# Patient Record
Sex: FEMALE | Race: WHITE
[De-identification: names, ages, dates, MRNs, and addresses within clinical notes are randomized per-mention and may not be internally consistent; named-entity substitution may affect disease eponyms.]

---

## 2017-06-14 ENCOUNTER — HOSPITAL ENCOUNTER (OUTPATIENT)
Dept: HOSPITAL 10 - SDS | Age: 47
Discharge: HOME | End: 2017-06-14
Attending: ORTHOPAEDIC SURGERY
Payer: COMMERCIAL

## 2017-06-14 VITALS — SYSTOLIC BLOOD PRESSURE: 141 MMHG | RESPIRATION RATE: 22 BRPM | DIASTOLIC BLOOD PRESSURE: 75 MMHG | HEART RATE: 86 BPM

## 2017-06-14 VITALS — RESPIRATION RATE: 17 BRPM | HEART RATE: 88 BPM | SYSTOLIC BLOOD PRESSURE: 134 MMHG | DIASTOLIC BLOOD PRESSURE: 64 MMHG

## 2017-06-14 VITALS — DIASTOLIC BLOOD PRESSURE: 64 MMHG | RESPIRATION RATE: 16 BRPM | HEART RATE: 80 BPM | SYSTOLIC BLOOD PRESSURE: 131 MMHG

## 2017-06-14 VITALS — HEART RATE: 80 BPM | RESPIRATION RATE: 16 BRPM | DIASTOLIC BLOOD PRESSURE: 63 MMHG | SYSTOLIC BLOOD PRESSURE: 135 MMHG

## 2017-06-14 VITALS — DIASTOLIC BLOOD PRESSURE: 65 MMHG | HEART RATE: 92 BPM | SYSTOLIC BLOOD PRESSURE: 136 MMHG | RESPIRATION RATE: 17 BRPM

## 2017-06-14 VITALS
BODY MASS INDEX: 35.96 KG/M2 | WEIGHT: 190.48 LBS | WEIGHT: 190.48 LBS | HEIGHT: 61 IN | BODY MASS INDEX: 35.96 KG/M2 | HEIGHT: 61 IN

## 2017-06-14 VITALS — HEART RATE: 86 BPM | DIASTOLIC BLOOD PRESSURE: 65 MMHG | RESPIRATION RATE: 19 BRPM | SYSTOLIC BLOOD PRESSURE: 111 MMHG

## 2017-06-14 VITALS — SYSTOLIC BLOOD PRESSURE: 130 MMHG | DIASTOLIC BLOOD PRESSURE: 66 MMHG | RESPIRATION RATE: 16 BRPM | HEART RATE: 80 BPM

## 2017-06-14 VITALS — RESPIRATION RATE: 16 BRPM | SYSTOLIC BLOOD PRESSURE: 76 MMHG | HEART RATE: 93 BPM

## 2017-06-14 VITALS — DIASTOLIC BLOOD PRESSURE: 65 MMHG | RESPIRATION RATE: 17 BRPM | HEART RATE: 84 BPM | SYSTOLIC BLOOD PRESSURE: 140 MMHG

## 2017-06-14 VITALS — RESPIRATION RATE: 16 BRPM | SYSTOLIC BLOOD PRESSURE: 134 MMHG | DIASTOLIC BLOOD PRESSURE: 68 MMHG | HEART RATE: 82 BPM

## 2017-06-14 VITALS — RESPIRATION RATE: 16 BRPM | SYSTOLIC BLOOD PRESSURE: 137 MMHG | HEART RATE: 80 BPM | DIASTOLIC BLOOD PRESSURE: 63 MMHG

## 2017-06-14 VITALS — DIASTOLIC BLOOD PRESSURE: 59 MMHG | RESPIRATION RATE: 16 BRPM | SYSTOLIC BLOOD PRESSURE: 126 MMHG | HEART RATE: 82 BPM

## 2017-06-14 VITALS — SYSTOLIC BLOOD PRESSURE: 138 MMHG | DIASTOLIC BLOOD PRESSURE: 61 MMHG | HEART RATE: 86 BPM | RESPIRATION RATE: 27 BRPM

## 2017-06-14 VITALS — HEART RATE: 92 BPM | DIASTOLIC BLOOD PRESSURE: 69 MMHG | RESPIRATION RATE: 16 BRPM | SYSTOLIC BLOOD PRESSURE: 132 MMHG

## 2017-06-14 VITALS — RESPIRATION RATE: 18 BRPM | DIASTOLIC BLOOD PRESSURE: 64 MMHG | HEART RATE: 84 BPM | SYSTOLIC BLOOD PRESSURE: 136 MMHG

## 2017-06-14 DIAGNOSIS — I10: ICD-10-CM

## 2017-06-14 DIAGNOSIS — J45.909: ICD-10-CM

## 2017-06-14 DIAGNOSIS — M23.204: Primary | ICD-10-CM

## 2017-06-14 DIAGNOSIS — E78.5: ICD-10-CM

## 2017-06-14 DIAGNOSIS — M94.262: ICD-10-CM

## 2017-06-14 PROCEDURE — C1713 ANCHOR/SCREW BN/BN,TIS/BN: HCPCS

## 2017-06-14 PROCEDURE — 29881 ARTHRS KNE SRG MNISECTMY M/L: CPT

## 2017-06-14 PROCEDURE — 84703 CHORIONIC GONADOTROPIN ASSAY: CPT

## 2017-06-14 NOTE — OPR
Date/Time of Note


Date/Time of Note


DATE: 6/14/17 


TIME: 15:27





Operative Report


Procedure Date:  Jun 14, 2017


Preoperative Diagnosis


1.  Left knee medial meniscal tear


Postoperative Diagnosis


1.  Left knee medial meniscal tear


2.  Left knee medial femoral condyle partial-thickness chondral defect


3.  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


Operation Performed


1. left knee arthroscopy with chondroplasty of the patellofemoral and medial 

compartment


2.  Left knee arthroscopy with medial meniscal repair


Surgeon:  ASMITA JI MD


Anesthesia:  general


Anesthesiologist:  NJ MELCHOR MD


Tourniquet Time:  60 minutes at 250 mmHg


Estimated Blood Loss:  minimal


Specimens


None


Complications:  None


Pt Condition Post Procedure:  stable


Disposition:  PACU


Indications


Patient is a 46-year-old female who sustained a traumatic injury to her right 

knee medial meniscus.  She attempted nonoperative management with 

rehabilitation and anti-inflammatories but continued to have clicking catching 

and locking in her knee.  MRI revealed a posterior horn medial meniscal tear in 

the periphery of the meniscus.  Patient was indicated for surgery after failure 

of nonoperative management.


Operative\Procedure Findings


Implants: 5 cc of platelet rich plasma spun on the Arthrex Steve machine at 4% 

hematocrit; 2 [employer] fast T fix meniscal repair device sutures


Procedure Description


RISK NOTE: Patient was explained the risks and benefits of the surgery in the 

patients native language, including not limited to infection, bleeding, loss of 

limb, loss of life, need for future surgery, risk of anesthesia, risk of injury 

to the blood vessels and nerves, ligaments or tendons, and risk of deep vein 

thrombosis.  Patient understood that she still may progress onto knee arthritis 

and that may require a knee replacement in the future.  Patient understood 

these risks and wished to proceed with the surgery.





OPERATIVE NOTE: 


The correct operative site was noted and marked in the preoperative holding 

area and confirmed with both patient and consent.  The patient was then brought 

back into the operative theater, placed supine on the operative table.





Left knee was examined under anesthesia.  Range of motion was 0-120.  There is 

no varus or valgus or anterior posterior instability.  There is no crepitus 

noticed at the patellofemoral joint.  





Tourniquet was then placed on the operative extremity thigh non-sterilely. 

Patient was then given preoperative antibiotics and then prepped and draped in 

normal sterile fashion.  A timeout was taken and all parties in the room agreed 

it was the correct patient, correct extremity and correct procedure.   





Standard anterior lateral portal was created and the knee joint was entered 

with a blunt tipped trocar, followed by 30 arthroscope.  Inflow was achieved 

with a pump and the pressure maintained at approximately 50 mmHg.  A routine 

arthroscopic surgery was performed.





Suprapatella pouch was unremarkable.  The undersurface of the patella showed  

no chondromalacia 





The medial and lateral gutters were visualized.  There were no loose bodies 

seen.  There was no inflamed hypertrophic plica noted in the anterior and 

superior medial aspect of the knee.  The popliteus hiatus was entered and was 

normal.  Lateral compartment was entered and no chondromalacia was seen on the 

lateral tibial plateau and femoral condyle.





Scope was then brought into the intercondylar notch and an anteromedial portal 

was made.  Shaver was brought into the knee and small amount of fat pad and 

scar tissue was initially gently debrided. 





The anterior cruciate ligament was intact and probed.  The knee was brought 

into a valgus position and the medial compartment was entered.  The articular 

surface of the medial femoral condyle and medial tibial plateau revealed a 

chondral defect that was partial thickness on the posterior aspect of the 

medial femoral condyle with a flap of cartilage.  This defect measured 

approximately 2 x 3 mm in size and was gently debrided with shaver. 

Chondroplasty was then carried out along the weightbearing aspect of the medial 

femoral condyle, medial tibial plateau, taking care to remove only loose 

articular cartilage debris and preserve functional articular cartilage.





A probe was introduced and a peripheral meniscal tear was found in the 

posterior aspect of the medial meniscus that was found to be in the red red 

zone and was carefully probed and was found to be unstable.  Given the location 

of the meniscal tear in the red red zone was determined the tear was amenable 

to fixation and a rasp was introduced to stimulate healing potential at the 

injury site of the meniscal tear.  2 Smith [employer] Nephew fast T fix sutures were 

placed in a vertical mattress fashion across the longitudinal tear in the 

meniscus.  The meniscus was reprobed at this time and found to be stable





The lateral compartment was reentered and the loose chondral debris was 

debrided with motorized shaver. 





Attention was then directed back to the patella femoral joint and a 

chondroplasty was carried out along the weightbearing aspect of the trochlea 

and undersurface of the patella to again remove loose debris and maintain 

functional active articular cartilage.





The knee was then irrigated with additional 2 L of lactated Ringers solution.  

Excess fluid was then drained.  





Range of motion was then attempted showing 0- 125 degrees of motion





The portal sites were closed with 4-0 Monocryl and Steri-Strips and dressed 

with Xeroform and triple antibiotic ointment.





The knee was then injected with 20 cc of 0.5% plain ropivacaine.  The knee was 

then injected with 5 cc of platelet rich plasma that was spun at 4% hematocrit.

  A dry sterile dressing was then applied followed by a bulky soft bandage in a 

thigh-high Talha stocking.  A T ROM knee brace was then applied to the knee and 

set at a 0 extension lock position





At the completion of the surgery patient had palpable pulses, soft arms and 

brisk cap refill.





The patient tolerated the procedure well and was taken to the PACU without any 

complications.





All sponge and needle counts were correct.





Patient will begin pain medicine and 48 hours of antibiotics as well as aspirin 

81 mg for the duration of 4 weeks postoperatively.  Patient remain 

nonweightbearing to the left leg for the next 4 weeks and will become toe-touch 

weightbearing at 4 weeks.











ASMITA JI MD Jun 14, 2017 15:35

## 2017-06-14 NOTE — HPN
Date/Time of Note


Date/Time of Note


DATE: 6/14/17 


TIME: 12:35





Interval H&P Admission Note


Pt. seen H&P reviewed:  No system changes











ASMITA JI MD Jun 14, 2017 15:27

## 2019-01-03 ENCOUNTER — HOSPITAL ENCOUNTER (OUTPATIENT)
Dept: HOSPITAL 91 - SDS | Age: 49
Discharge: HOME | End: 2019-01-03
Payer: COMMERCIAL

## 2019-01-03 DIAGNOSIS — M23.200: ICD-10-CM

## 2019-01-03 DIAGNOSIS — M94.261: ICD-10-CM

## 2019-01-03 DIAGNOSIS — M23.41: ICD-10-CM

## 2019-01-03 DIAGNOSIS — M23.221: Primary | ICD-10-CM

## 2019-01-03 PROCEDURE — 82306 VITAMIN D 25 HYDROXY: CPT

## 2019-01-03 PROCEDURE — 29880 ARTHRS KNE SRG MNISECTMY M&L: CPT

## 2019-01-03 RX ADMIN — FENTANYL CITRATE 1 MCG: 50 INJECTION, SOLUTION INTRAMUSCULAR; INTRAVENOUS at 09:34

## 2019-01-03 RX ADMIN — KETOROLAC TROMETHAMINE 1 ML: 30 INJECTION, SOLUTION INTRAMUSCULAR at 08:22

## 2019-01-03 RX ADMIN — HYDROMORPHONE HYDROCHLORIDE 1 MG: 2 INJECTION INTRAMUSCULAR; INTRAVENOUS; SUBCUTANEOUS at 09:25

## 2019-01-03 RX ADMIN — FENTANYL CITRATE 1 MCG: 50 INJECTION, SOLUTION INTRAMUSCULAR; INTRAVENOUS at 09:26

## 2019-01-03 RX ADMIN — KETOROLAC TROMETHAMINE 1 MG: 30 INJECTION, SOLUTION INTRAMUSCULAR at 09:25

## 2019-01-03 RX ADMIN — HYDROMORPHONE HYDROCHLORIDE 1 MG: 2 INJECTION INTRAMUSCULAR; INTRAVENOUS; SUBCUTANEOUS at 09:35
